# Patient Record
Sex: FEMALE | Race: WHITE
[De-identification: names, ages, dates, MRNs, and addresses within clinical notes are randomized per-mention and may not be internally consistent; named-entity substitution may affect disease eponyms.]

---

## 2017-09-15 ENCOUNTER — HOSPITAL ENCOUNTER (EMERGENCY)
Dept: HOSPITAL 45 - C.EDB | Age: 37
Discharge: HOME | End: 2017-09-15
Payer: SELF-PAY

## 2017-09-15 VITALS
WEIGHT: 165.57 LBS | WEIGHT: 165.57 LBS | BODY MASS INDEX: 28.27 KG/M2 | HEIGHT: 64.02 IN | BODY MASS INDEX: 28.27 KG/M2 | HEIGHT: 64.02 IN

## 2017-09-15 VITALS — TEMPERATURE: 98.06 F

## 2017-09-15 VITALS — DIASTOLIC BLOOD PRESSURE: 85 MMHG | OXYGEN SATURATION: 98 % | SYSTOLIC BLOOD PRESSURE: 132 MMHG | HEART RATE: 77 BPM

## 2017-09-15 DIAGNOSIS — Z86.19: ICD-10-CM

## 2017-09-15 DIAGNOSIS — Z98.51: ICD-10-CM

## 2017-09-15 DIAGNOSIS — N39.0: Primary | ICD-10-CM

## 2017-09-15 DIAGNOSIS — Z87.440: ICD-10-CM

## 2017-09-15 NOTE — EMERGENCY ROOM VISIT NOTE
ED Visit Note


First contact with patient:  09:19


CHIEF COMPLAINT:  Frequent and painful urination for 2 days





HISTORY OF PRESENT ILLNESS:  Patient is an otherwise healthy 37-year-old white 

female who presents to the emergency department for evaluation of UTI symptoms 

started about 2 days ago.  She notes urinary frequency, urgency, dysuria and a 

feeling of incomplete voiding.  She denies back pain, fever, or vaginal 

discharge.  Last menstrual period was 2 weeks ago, and she denies pregnancy 

secondary to a tubal ligation.  She has not had a UTI in several years.  She 

tried using over-the-counter Azo which helped slightly.





REVIEW OF SYSTEMS:  Review of systems as per HPI.  All other systems reviewed 

were negative.  At least 6 systems reviewed.





PMH:  Electronic medical records are reviewed and summarized as above/below.  

See Problem List.





SOCIAL HISTORY:  Patient lives at home with her  and family.  Smoker.  

She is employed.





PHYSICAL EXAM: Vital Signs: Reviewed Nurse's notes. 


CONSTITUTIONAL: Patient is a pleasant, well-appearing 37-year-old white female 

who is awake and alert and in no acute distress.


CARDIOVASCULAR: Regular rate and rhythm.


RESPIRATORY: Breath sounds equal and clear to auscultation without wheezes, 

rales, or rhonchi heard.   Full and equal chest expansion without accessory 

muscle use or retractions. 


ABDOMEN: Bowel sounds are present.  Abdomen is soft, nontender and 

nondistended.  No CVA tenderness.


INTEGUMENTARY: No lesions or rash, normal skin turgor. 


LYMPH: No lymphadenopathy.





EMERGENCY DEPARTMENT COURSE: Patient provided a urine sample which was sent for 

culture.  Old records were reviewed.  Prior urine cultures show pansensitive 

Escherichia coli.  Patient was given a Pyridium home pack and a dose of Bactrim 

DS in the emergency department, a prescription for a 7 day course.  

Differential diagnoses entertained includes UTI, hemorrhagic cystitis, PID, 

cervicitis, pyelonephritis among others.





Medication reconciliation: I attest that I have personally reviewed the patient'

s current medication list.





Blood pressure screening : Patient was found to have normal blood pressure on 

screening and does not require follow-up.


Problem List


Medical Problems:


(1) Acute bronchitis


Status: Resolved  





(2) Bronchitis


Status: Resolved  





(3) Encounter for removal of sutures


Status: Resolved  





(4) Laceration of face, complicated


Status: Resolved  





(5) Right knee pain


Status: Resolved  





(6) Sinusitis


Status: Resolved  





(7) Syncope


Status: Resolved  





(8) UTI (urinary tract infection)


Status: Resolved  





Surgical Problems:


(1)  section x2


Status: Resolved  





(2) History of tubal ligation


Status: Resolved  











Current/Historical Medications


Scheduled


Phenazopyridine HCl (Pyridium), 200 MG PO TID


Phenazopyridine Hcl (Azo Tabs), 1 TAB PO BID


Sulfa/Trimethoprim (Bactrim Ds 800MG/160MG), 1 TAB PO BID





Scheduled PRN


Hydrocodone/Acetaminophen 5MG/325MG (Norco 5MG/325MG), 1 TABLET PO TID PRN for 

Pain





Allergies


Coded Allergies:  


     No Known Allergies (Verified , 13)





Vital Signs











  Date Time  Temp Pulse Resp B/P (MAP) Pulse Ox O2 Delivery O2 Flow Rate FiO2


 


9/15/17 09:43  77 20 132/85 98   


 


9/15/17 09:09 36.7 77 20 132/85 98 Room Air  











Medications Administered











 Medications


  (Trade)  Dose


 Ordered  Sig/Susana


 Route  Start Time


 Stop Time Status Last Admin


Dose Admin


 


 Phenazopyridine


 HCl


  (Phenazopyridine


 HCl 200MG Home


 Pack)  1 homepack  UD  ONCE


 PO  9/15/17 09:30


 9/15/17 09:31 DC 9/15/17 09:41


1 HOMEPACK


 


 Trimethoprim/


 Sulfamethoxazole


  (Septra Ds 800/


 160MG Tab)  1 tab  NOW  STAT


 PO  9/15/17 09:29


 9/15/17 09:31 DC 9/15/17 09:41


1 TAB











Departure Information


Impression





 Primary Impression:  


 UTI (urinary tract infection)





Prescriptions





Phenazopyridine HCl (Pyridium) 200 Mg Tab


200 MG PO TID, #10 TAB


   Prov: Halie Branham PA         9/15/17 


Sulfa/Trimethoprim (Bactrim Ds 800MG/160MG)  Tab


1 TAB PO BID, #14 TAB


   Prov: Halie Branham PA         9/15/17





Referrals


Jose Gallagher D.OMeliton (PCP)





Patient Instructions


My [facility]





Additional Instructions





Trimethoprim-Sulfamethoxazole(Bactrim DS): Take one pill twice daily for 7 days 

for your urine infection.  All antibiotics can cause diarrhea.  If this occurs 

and you feel worse or it does not resolve in 1-2 days follow up with your 

doctor or return to the Emergency Department as this could be signs of serious 

underlying problems.  Any medication can cause an allergic reaction, stop the 

pills immediately and return to the ER for rash, hives, breathing difficulties, 

or swelling.





Pyridium 200mg: Take one pill three times daily as needed for urinary 

discomfort.  This medication will turn your urine orange.  This is normal and 

nothing to be concerned about.





Ibuprofen(Motrin, Advil) may be used for fever or pain.  Use 600mg every six 

hours as needed.  Take with food.  Avoid using more than 2400mg in a 24 hour 

period.  Do not use 2400mg per day for more than three consecutive days without 

physician direction.  Prolonged inappropriate use can lead to stomach upset or 

ulcers.  This is available over the counter and typically comes in 200mg 

tablets.  


(AND/OR)


Acetaminophen(Tylenol) may be used for fever or pain.  Use 1000mg every eight 

hours as needed.  Avoid using more than 3000mg in a 24 hour period.  This is 

available over the counter.





Read all the package inserts or medication information paperwork provided.  If 

you have any questions or concerns call your primary provider, pharmacist or 

the ER for assistance.





Rest and drink plenty of fluids.





Continue current medications.





Return to the ER immediately for worsening or persistent abdominal pain, 

vomiting, fevers, back or flank pain, worsening of your condition, or as needed.





Follow up with your primary physician within 2-3 days for a recheck of the 

current condition.

## 2017-11-17 ENCOUNTER — HOSPITAL ENCOUNTER (EMERGENCY)
Dept: HOSPITAL 45 - C.EDB | Age: 37
Discharge: HOME | End: 2017-11-17
Payer: SELF-PAY

## 2017-11-17 VITALS
HEIGHT: 64.02 IN | WEIGHT: 167.77 LBS | BODY MASS INDEX: 28.64 KG/M2 | BODY MASS INDEX: 28.64 KG/M2 | WEIGHT: 167.77 LBS | HEIGHT: 64.02 IN

## 2017-11-17 VITALS — TEMPERATURE: 98.6 F

## 2017-11-17 VITALS — HEART RATE: 78 BPM | SYSTOLIC BLOOD PRESSURE: 139 MMHG | DIASTOLIC BLOOD PRESSURE: 78 MMHG | OXYGEN SATURATION: 98 %

## 2017-11-17 DIAGNOSIS — Z82.49: ICD-10-CM

## 2017-11-17 DIAGNOSIS — F17.200: ICD-10-CM

## 2017-11-17 DIAGNOSIS — Z87.442: ICD-10-CM

## 2017-11-17 DIAGNOSIS — R07.9: Primary | ICD-10-CM

## 2017-11-17 DIAGNOSIS — Z98.51: ICD-10-CM

## 2017-11-17 LAB
ALBUMIN/GLOB SERPL: 1.1 {RATIO} (ref 0.9–2)
ALP SERPL-CCNC: 51 U/L (ref 45–117)
ALT SERPL-CCNC: 27 U/L (ref 12–78)
ANION GAP SERPL CALC-SCNC: 9 MMOL/L (ref 3–11)
APPEARANCE UR: CLEAR
AST SERPL-CCNC: 13 U/L (ref 15–37)
BASOPHILS # BLD: 0.01 K/UL (ref 0–0.2)
BASOPHILS NFR BLD: 0.1 %
BILIRUB UR-MCNC: (no result) MG/DL
BUN SERPL-MCNC: 10 MG/DL (ref 7–18)
BUN/CREAT SERPL: 10.7 (ref 10–20)
CALCIUM SERPL-MCNC: 9.1 MG/DL (ref 8.5–10.1)
CHLORIDE SERPL-SCNC: 105 MMOL/L (ref 98–107)
CO2 SERPL-SCNC: 26 MMOL/L (ref 21–32)
COLOR UR: (no result)
COMPLETE: YES
CREAT CL PREDICTED SERPL C-G-VRATE: 84.6 ML/MIN
CREAT SERPL-MCNC: 0.91 MG/DL (ref 0.6–1.2)
EOSINOPHIL NFR BLD AUTO: 252 K/UL (ref 130–400)
GLOBULIN SER-MCNC: 3.7 GM/DL (ref 2.5–4)
GLUCOSE SERPL-MCNC: 79 MG/DL (ref 70–99)
HCT VFR BLD CALC: 39.8 % (ref 37–47)
IG%: 0.3 %
IMM GRANULOCYTES NFR BLD AUTO: 29.9 %
LYMPHOCYTES # BLD: 3.19 K/UL (ref 1.2–3.4)
MANUAL MICROSCOPIC REQUIRED?: NO
MCH RBC QN AUTO: 32.2 PG (ref 25–34)
MCHC RBC AUTO-ENTMCNC: 35.2 G/DL (ref 32–36)
MCV RBC AUTO: 91.5 FL (ref 80–100)
MONOCYTES NFR BLD: 6.8 %
NEUTROPHILS # BLD AUTO: 0.8 %
NEUTROPHILS NFR BLD AUTO: 62.1 %
NITRITE UR QL STRIP: (no result)
PH UR STRIP: 5 [PH] (ref 4.5–7.5)
PMV BLD AUTO: 12.1 FL (ref 7.4–10.4)
POTASSIUM SERPL-SCNC: 3.3 MMOL/L (ref 3.5–5.1)
RBC # BLD AUTO: 4.35 M/UL (ref 4.2–5.4)
REVIEW REQ?: NO
SODIUM SERPL-SCNC: 140 MMOL/L (ref 136–145)
SP GR UR STRIP: 1.03 (ref 1–1.03)
URINE BILL WITH OR WITHOUT MIC: (no result)
URINE EPITHELIAL CELL AUTO: (no result) /LPF (ref 0–5)
UROBILINOGEN UR-MCNC: (no result) MG/DL
WBC # BLD AUTO: 10.67 K/UL (ref 4.8–10.8)

## 2017-11-17 NOTE — DIAGNOSTIC IMAGING REPORT
CT ANGIOGRAPHY OF THE CHEST, PULMONARY EMBOLUS PROTOCOL



CLINICAL HISTORY: Right sided rib and back pain. No trauma.    



COMPARISON STUDY:  Chest CT March 23, 2015 and chest radiograph performed

earlier today. 



TECHNIQUE: Following IV administration of 93 mL of Optiray-320, helical axial

images of the chest were obtained utilizing the pulmonary embolus protocol. 

Maximal intensity projections and sagittal and coronal reformats were viewed on

an independent 3D workstation.  IV contrast was administered without

complication.  A dose lowering technique was utilized adhering to the principles

of ALARA.





CT DOSE: 345.96 mGy.cm



FINDINGS:  No pulmonary emboli are identified. The size of the heart is normal.

There is no pericardial effusion. There is no evidence of thoracic aortic

dissection. No enlarged axillary, mediastinal or hilar lymph nodes are present.

Central airways are patent. There is no consolidation to suggest pneumonia. No

pneumomediastinum is present. There is no pneumothorax or pleural effusion. No

fractures are identified within visualized portions of the ribs. Upper abdomen

is unremarkable.



IMPRESSION:  



1. No pulmonary emboli identified.



2. No acute intrathoracic findings.











Electronically signed by:  Boni Napier M.D.

11/17/2017 9:46 PM



Dictated Date/Time:  11/17/2017 9:38 PM

## 2017-11-17 NOTE — DIAGNOSTIC IMAGING REPORT
CHEST ONE VIEW PORTABLE



CLINICAL HISTORY: Right sided rib and back pain. No injury.    



COMPARISON STUDY:  Chest radiograph August 14, 2016.



FINDINGS: Lung volumes are normal. Lungs are clear. No pneumothorax or pleural

effusion is present. Pulmonary vascularity is normal. Cardiomediastinal

silhouette is normal. 



IMPRESSION:  No acute cardiopulmonary findings. 









Electronically signed by:  Boni Napier M.D.

11/17/2017 8:36 PM



Dictated Date/Time:  11/17/2017 8:35 PM

## 2017-11-17 NOTE — EMERGENCY ROOM VISIT NOTE
History


First contact with patient:  19:53


Chief Complaint:  RIB PAIN


Stated Complaint:  PAIN R SIDE, RIB CAGE AREA FOR 4 DAYS





History of Present Illness


The patient is a 37 year old female who presents to the Emergency Room with 

complaints of right sided rib and back pain that started 4 days ago.  The 

patient denies any significant injury.  She does admit to being fairly active 

at work.  She works at a [employer].  The pain is slightly worse with movement.

  It is also worsened with deep inspiration.  She denies any pressure in the 

front of her chest.  No nausea or vomiting.  No dizziness, lightheadedness, 

heart palpitations or sweating.  The patient has tried ibuprofen with minimal 

relief of the pain.





Review of Systems


10 system review performed and  negative unless noted in HPI or below





Past Medical/Surgical History


Medical Problems:


(1) Acute bronchitis


(2) Bronchitis


(3) Encounter for removal of sutures


(4) Laceration of face, complicated


(5) Right knee pain


(6) Sinusitis


(7) Syncope


(8) UTI (urinary tract infection)


Surgical Problems:


(1)  section x2


(2) History of tubal ligation








Family History





Hypertension





Social History


Smoking Status:  Current Every Day Smoker


Alcohol Use:  none


Drug Use:  none


Marital Status:  in relationship


Housing Status:  lives with family


Occupation Status:  employed





Current/Historical Medications


Scheduled PRN


Ibuprofen (Advil), 400 MG PO Q6 PRN for Pain





Physical Exam


Vital Signs











  Date Time  Temp Pulse Resp B/P (MAP) Pulse Ox O2 Delivery O2 Flow Rate FiO2


 


17 19:47 37.0 122 18 135/85 99 Room Air  











Physical Exam


VITALS: Vitals are noted on the nurse's note and reviewed by myself.  Vital 

signs stable.


GENERAL: 37-year-old female, mildly uncomfortable,nondiaphoretic, well-

developed well-nourished.


SKIN: The skin was without rashes, erythema, edema, or bruising.  


HEAD: Normocephalic atraumatic.  


NECK: Supple without nuchal rigidity.  No lymphadenopathy. No JVD.


HEART: Regular rate and rhythm without murmurs gallops or rubs.


No tenderness palpated over the thorax.


LUNGS: Clear to auscultation bilaterally without wheezes, rales or rhonchi.   

No accessory muscle use.


ABDOMEN: Positive bowel sounds x 4.Soft, nontender, without organomegaly. No 

guarding or rebound tenderness.


MUSCULOSKELETAL: Slight discomfort with abduction of the right shoulder.  No 

tenderness elicited over the trapezius muscle.  No tenderness over the scapula.

  No tenderness over the ribs bilaterally.  Strength 5/5 throughout.


NEURO: Patient was alert and oriented to person place and time.  Normal 

sensation to touch. No focal neurological deficits.





Medical Decision & Procedures


ER Provider


Diagnostic Interpretation:


CT chest w/ contrast


IMPRESSION:  





1. No pulmonary emboli identified.





2. No acute intrathoracic findings.

















Electronically signed by:  Boni Napier M.D.


2017 9:46 PM





Dictated Date/Time:  2017 9:38 PM





 The status of this report is Signed.   


 Draft = Not yet reviewed or approved by Radiologist.  


 Signed = Reviewed and approved by Radiologist.








CXR


IMPRESSION:  No acute cardiopulmonary findings. 














Electronically signed by:  Boni Napier M.D.


2017 8:36 PM





Dictated Date/Time:  2017 8:35 PM





 The status of this report is Signed.   


 Draft = Not yet reviewed or approved by Radiologist.  


 Signed = Reviewed and approved by Radiologist.





Laboratory Results


17 20:25








Red Blood Count 4.35, Mean Corpuscular Volume 91.5, Mean Corpuscular Hemoglobin 

32.2, Mean Corpuscular Hemoglobin Concent 35.2, Mean Platelet Volume 12.1, 

Neutrophils (%) (Auto) 62.1, Lymphocytes (%) (Auto) 29.9, Monocytes (%) (Auto) 

6.8, Eosinophils (%) (Auto) 0.8, Basophils (%) (Auto) 0.1, Neutrophils # (Auto) 

6.62, Lymphocytes # (Auto) 3.19, Monocytes # (Auto) 0.73, Eosinophils # (Auto) 

0.09, Basophils # (Auto) 0.01





17 20:25

















Test


  17


20:25 17


21:15


 


White Blood Count


  10.67 K/uL


(4.8-10.8) 


 


 


Red Blood Count


  4.35 M/uL


(4.2-5.4) 


 


 


Hemoglobin


  14.0 g/dL


(12.0-16.0) 


 


 


Hematocrit 39.8 % (37-47)  


 


Mean Corpuscular Volume


  91.5 fL


() 


 


 


Mean Corpuscular Hemoglobin


  32.2 pg


(25-34) 


 


 


Mean Corpuscular Hemoglobin


Concent 35.2 g/dl


(32-36) 


 


 


Platelet Count


  252 K/uL


(130-400) 


 


 


Mean Platelet Volume


  12.1 fL


(7.4-10.4) 


 


 


Neutrophils (%) (Auto) 62.1 %  


 


Lymphocytes (%) (Auto) 29.9 %  


 


Monocytes (%) (Auto) 6.8 %  


 


Eosinophils (%) (Auto) 0.8 %  


 


Basophils (%) (Auto) 0.1 %  


 


Neutrophils # (Auto)


  6.62 K/uL


(1.4-6.5) 


 


 


Lymphocytes # (Auto)


  3.19 K/uL


(1.2-3.4) 


 


 


Monocytes # (Auto)


  0.73 K/uL


(0.11-0.59) 


 


 


Eosinophils # (Auto)


  0.09 K/uL


(0-0.5) 


 


 


Basophils # (Auto)


  0.01 K/uL


(0-0.2) 


 


 


RDW Standard Deviation


  43.2 fL


(36.4-46.3) 


 


 


RDW Coefficient of Variation


  12.8 %


(11.5-14.5) 


 


 


Immature Granulocyte % (Auto) 0.3 %  


 


Immature Granulocyte # (Auto)


  0.03 K/uL


(0.00-0.02) 


 


 


D-Dimer


  520 ug/L FEU


(0-500) 


 


 


Anion Gap


  9.0 mmol/L


(3-11) 


 


 


Est Creatinine Clear Calc


Drug Dose 84.6 ml/min 


  


 


 


Estimated GFR (


American) 93.4 


  


 


 


Estimated GFR (Non-


American 80.6 


  


 


 


BUN/Creatinine Ratio 10.7 (10-20)  


 


Calcium Level


  9.1 mg/dl


(8.5-10.1) 


 


 


Total Bilirubin


  0.4 mg/dl


(0.2-1) 


 


 


Aspartate Amino Transf


(AST/SGOT) 13 U/L (15-37) 


  


 


 


Alanine Aminotransferase


(ALT/SGPT) 27 U/L (12-78) 


  


 


 


Alkaline Phosphatase


  51 U/L


() 


 


 


Troponin I


  < 0.015 ng/ml


(0-0.045) 


 


 


Total Protein


  7.8 gm/dl


(6.4-8.2) 


 


 


Albumin


  4.1 gm/dl


(3.4-5.0) 


 


 


Globulin


  3.7 gm/dl


(2.5-4.0) 


 


 


Albumin/Globulin Ratio 1.1 (0.9-2)  


 


Lipase


  227 U/L


() 


 


 


Urine Color  DK YELLOW 


 


Urine Appearance  CLEAR (CLEAR) 


 


Urine pH  5.0 (4.5-7.5) 


 


Urine Specific Gravity


  


  1.030


(1.000-1.030)


 


Urine Protein  NEG (NEG) 


 


Urine Glucose (UA)  NEG (NEG) 


 


Urine Ketones  TRACE (NEG) 


 


Urine Occult Blood  2+ (NEG) 


 


Urine Nitrite  NEG (NEG) 


 


Urine Bilirubin  NEG (NEG) 


 


Urine Urobilinogen  NEG (NEG) 


 


Urine Leukocyte Esterase  NEG (NEG) 


 


Urine WBC (Auto)  1-5 /hpf (0-5) 


 


Urine RBC (Auto)


  


  5-10 /hpf


(0-4)


 


Urine Hyaline Casts (Auto)  1-5 /lpf (0-5) 


 


Urine Epithelial Cells (Auto)


  


  20-30 /lpf


(0-5)


 


Urine Bacteria (Auto)  NEG (NEG) 


 


Urine Pregnancy Test  NEG (NEG) 











Medications Administered











 Medications


  (Trade)  Dose


 Ordered  Sig/Susana


 Route  Start Time


 Stop Time Status Last Admin


Dose Admin


 


 Ketorolac


 Tromethamine


  (Toradol Inj)  30 mg  NOW  STAT


 IV  17 20:06


 17 20:08 DC 17 21:03


30 MG


 


 Cyclobenzaprine


 HCl


  (Flexeril Tab)  10 mg  NOW  STAT


 PO  17 20:06


 17 20:09 DC 17 21:02


10 MG


 


 Morphine Sulfate


  (MoRPHine


 SULFATE INJ)  4 mg  ONE  STAT


 IV  17 21:25


 17 21:26 DC 17 21:51


4 MG











ECG


Indication:  back/shoulder pain


Rate (beats per minute):  87


Rhythm:  normal sinus


Findings:  other (nonspecific ST changes in II and III.  No reciprocal changes 

noted.)


Change:  no significant change





ED Course


Patient was seen and examined


Vital signs including  blood pressure were reviewed


medications list was verified with patient


The patient was put on a monitor


Labs were obtained, and a saline lock was established


The patient was medicated with Toradol and Flexeril.


Imaging was performed and reviewed


Upon reevaluation, the patient was still complaining of pain.  She was given 1 

dose of morphine 4 mg IV


We discussed the results of her workup.  She will voiced understanding.  She 

was resting more comfortably.


I reviewed discharge instructions the patient.  They voiced understanding and 

had no further questions.





Medical Decision


Differential diagnosis: Acute myocardial infarction, cardiac arrhythmia, anemia

, thyroid abnormality, pneumothorax, pneumonia, bronchitis, pericarditis, 

electrolyte imbalance , musculoskeletal pain, rib contusion, rib fracture, 

pulmonary embolus





This patient is a 37-year-old female that presents to the emergency department 

with complaints of right-sided rib pain radiating to her back.  She did not 

have any significant trauma.  I cannot elicit a significant amount of 

discomfort with range of motion or palpation.  She does smoke.  Her d-dimer was 

slightly elevated.  For this reason, I opted to do a CT of the chest to rule 

out PE.  This was negative for any pulmonary embolus or trauma.  Her EKG is 

unchanged when compared to previous EKGs.  Her troponin is negative.  I do not 

suspect acute cardiac ischemia.  This is likely musculoskeletal pain.  The 

patient was given a short course of a muscle relaxant and tramadol.  She was 

instructed to follow-up with her primary care physician if there is no 

improvement in the next 5 days.  She'll return to the emergency department with 

any new or worsening symptoms.





This chart was completed in part utilizing Dragon Speech Voice Recognition 

software. Attempts were made to minimize the grammatical errors, random word 

insertions, pronoun errors and incomplete sentences. Any formal questions or 

concerns about the content, text or information contained within the body of 

this dictation should be directly addressed to the provider for clarification.





Medication Reconcilliation


Current Medication List:  was personally reviewed by me





Blood Pressure Screening


Patient's blood pressure:  Normal blood pressure





Impression





 Primary Impression:  


 Right-sided chest pain





Departure Information


Dispostion


Home / Self-Care





Condition


CONVENIENCE OF 





Prescriptions





Cyclobenzaprine Hcl (FLEXERIL) 10 Mg Tab


10 MG PO TID for Muscle Spasms, #15 TAB


   Prov: Karen Grigsby PA-C         17 


Tramadol (Ultram) 50 Mg Tab


1 TAB PO Q4H Y for Pain, #15 TAB


   For Initial Treatment


   Prov: Karen Grigsby PA-C         17





Referrals


No Doctor, Assigned (PCP)





Patient Instructions


My [facility]





Additional Instructions





You were evaluated in the emergency department for right-sided chest/rib pain.  

This is likely musculoskeletal in nature.





No strenuous activity for 2 days.  Please apply a heating pad intermittently to 

the affected area





Ibuprofen 600 mg every 8 hours as needed for pain.


Ultram 1 tab every 4 hours as needed for severe pain.  This may be taken with 

ibuprofen.





Flexeril 1 tab every 8 hours as needed for muscle spasms/pain.  Please do not 

drive or operate machinery while taking this medication.  It may make you 

drowsy.





Please follow-up with her primary care physician if there is no improvement in 

the next 5 days.





Please return to the emergency department with any new or concerning symptoms.

## 2018-01-01 ENCOUNTER — HOSPITAL ENCOUNTER (EMERGENCY)
Dept: HOSPITAL 45 - C.EDB | Age: 38
Discharge: HOME | End: 2018-01-01
Payer: SELF-PAY

## 2018-01-01 VITALS
HEIGHT: 65 IN | BODY MASS INDEX: 29.13 KG/M2 | HEIGHT: 65 IN | WEIGHT: 174.83 LBS | BODY MASS INDEX: 29.13 KG/M2 | WEIGHT: 174.83 LBS

## 2018-01-01 VITALS — HEART RATE: 84 BPM | OXYGEN SATURATION: 99 % | SYSTOLIC BLOOD PRESSURE: 129 MMHG | DIASTOLIC BLOOD PRESSURE: 93 MMHG

## 2018-01-01 VITALS — TEMPERATURE: 98.24 F

## 2018-01-01 DIAGNOSIS — Z82.49: ICD-10-CM

## 2018-01-01 DIAGNOSIS — N39.0: Primary | ICD-10-CM

## 2018-01-01 DIAGNOSIS — Z98.51: ICD-10-CM

## 2018-01-01 DIAGNOSIS — Z98.891: ICD-10-CM

## 2018-01-01 DIAGNOSIS — Z87.440: ICD-10-CM

## 2018-01-01 NOTE — EMERGENCY ROOM VISIT NOTE
History


First contact with patient:  08:44


Chief Complaint:  URINARY SYMPTOMS


Stated Complaint:  URINARY TRACT INFECTION





History of Present Illness


The patient is a 37 year old female who presents to the Emergency Room with 

complaints of urinary symptoms.  The patient reports that she has had urinary 

urgency and dysuria for the past 6 hours.  She has a history of UTIs and states 

this feels similar.  She rates her discomfort a 5/10.  She denies abdominal pain

, back pain, vaginal discharge or fevers.





Review of Systems


A complete 10 point review of systems was reviewed with the patient with 

pertinent positives and negatives as per history of present illness. All else 

were negative.





Past Medical/Surgical History


Medical Problems:


(1) Acute bronchitis


(2) Bronchitis


(3) Encounter for removal of sutures


(4) Laceration of face, complicated


(5) Right knee pain


(6) Sinusitis


(7) Syncope


(8) UTI (urinary tract infection)


Surgical Problems:


(1)  section x2


(2) History of tubal ligation








Family History





Hypertension





Social History


Smoking Status:  Never Smoker


Alcohol Use:  none


Drug Use:  none


Marital Status:  in relationship


Housing Status:  lives with family


Occupation Status:  employed





Current/Historical Medications


No Active Prescriptions or Reported Meds





Physical Exam


Vital Signs











  Date Time  Temp Pulse Resp B/P (MAP) Pulse Ox O2 Delivery O2 Flow Rate FiO2


 


18 08:41 36.8 92 18 126/81 98 Room Air  











Physical Exam


VITALS: Vitals are noted on the nurse's note and reviewed by myself.  Vital 

signs stable.


GENERAL: This is a 37-year-old female, in no acute distress, nondiaphoretic, 

well-developed well-nourished.


HEART: Regular rate and rhythm without murmurs gallops or rubs.


LUNGS: Clear to auscultation bilaterally without wheezes, rales or rhonchi.  


ABDOMEN: Positive bowel sounds x 4.  Soft, nontender to palpation.  No CVA 

tenderness.


NEURO: Patient was alert and oriented to person place and time.





Medical Decision & Procedures


Medical Decision


Differential diagnosis includes UTI, vaginal infection, interstitial cystitis, 

among others.





Patient was evaluated as above.  Presentation is consistent with urinary tract 

infection.  Culture was sent.  Urine dip was contaminated due to use of 

Pyridium.  Patient will be placed on Macrobid.  She verbalized understanding of 

my assessment and treatment plan and was discharged home in good condition.





Medication Reconcilliation


Current Medication List:  was personally reviewed by me





Blood Pressure Screening


Patient's blood pressure:  Normal blood pressure





Impression





 Primary Impression:  


 UTI (urinary tract infection)





Departure Information


Dispostion


Home / Self-Care





Condition


GOOD





Prescriptions





No Active Prescriptions or Reported Meds





Referrals


No Doctor, Assigned (PCP)





Patient Instructions


My [facility]





Additional Instructions





You have been treated in the Emergency Department for a Urinary Tract Infection 

(UTI).





You have been prescribed Macrobid to be taken twice daily as prescribed. This 

is an antibiotic. All antibiotics have the potential to cause diarrhea. Stop 

this medication and contact a medical provider if you were to develop any 

significant adverse side effects including: wheezing, shortness of breath, 

passing out, vomiting, or a diffuse rash. Always take antibiotics as directed 

and COMPLETE the ENTIRE course regardless of the improvement of your symptoms.





You have been prescribed Pyridium to be taken as prescribed. This medicine will 

help with the urinary symptoms that you have been experiencing. Be aware that 

Pyridium may turn your urine a red-orange or brown color. This effect is 

harmless.





Drink plenty of water and stay well hydrated.


As with any trip to the Emergency Department, you should follow-up with your 

Primary Care Provider from today's visit.





Return to the emergency department if your symptoms persist despite treatment 

plan outlined above or if the following symptoms occur: increased fevers, chills

, low back pain, nausea/vomiting, or blood in your urine.





Problem Qualifiers








 Primary Impression:  


 UTI (urinary tract infection)

## 2018-08-11 ENCOUNTER — HOSPITAL ENCOUNTER (EMERGENCY)
Dept: HOSPITAL 45 - C.EDB | Age: 38
Discharge: HOME | End: 2018-08-11
Payer: SELF-PAY

## 2018-08-11 VITALS — OXYGEN SATURATION: 100 % | HEART RATE: 77 BPM | DIASTOLIC BLOOD PRESSURE: 91 MMHG | SYSTOLIC BLOOD PRESSURE: 123 MMHG

## 2018-08-11 VITALS
HEIGHT: 64.02 IN | WEIGHT: 164.46 LBS | HEIGHT: 64.02 IN | WEIGHT: 164.46 LBS | BODY MASS INDEX: 28.08 KG/M2 | BODY MASS INDEX: 28.08 KG/M2

## 2018-08-11 VITALS — TEMPERATURE: 98.06 F

## 2018-08-11 DIAGNOSIS — F17.200: ICD-10-CM

## 2018-08-11 DIAGNOSIS — N39.0: Primary | ICD-10-CM

## 2018-08-12 NOTE — EMERGENCY ROOM VISIT NOTE
ED Visit Note


First contact with patient:  11:22


Chief Complaint: Urinary symptoms.





History of Present Illness: Ms. Smith is a 38-year-old white female who 

ambulates into the ED accompanied by her daughter complaining of urinary 

symptoms.


Historically patient reports she has typically had 2 urinary tract infections a 

year for many years.  Additionally she does reports she is status post  

section and tubal ligation.


Patient reports she was feeling well yesterday and then 3:00 this morning, 

approximately 8 hours ago, she woke from sleep to go to the bathroom and 

noticed urinary burning.  Since that time she has been having increasing 

urinary burning and increased urinary frequency and feelings of incomplete 

voiding after urination.


She reports the burning is at the late phase of her urination.  She rates her 

discomfort 6/10.  She also reports mild suprapubic discomfort.  She describes 

this as a cramping sensation.  She rates this discomfort 2/10.  This is 

constant and slightly worsened with urination.  She has not identified any 

alleviating factors related to the symptoms.  For her urinary burning she 

reports she is taken over-the-counter AZO twice since the onset of her symptoms.


She denies any associated fevers, chills, sweats, skin eruptions, skin color 

changes, upper abdominal pain, back/flank pain, nausea, vomiting, diarrhea, 

constipation, rectal bleeding, black/tarry stools, vaginal bleeding, vaginal 

discharge.





Review of Systems: As noted above in history of present illness.  8 body 

systems were reviewed and found to be negative as noted above.





Past Medical History: As previously noted.  


Current Medications: Patient denies.


Allergies to Medications: Patient denies.


Social History: Patient is currently employed; she feels safe in her home 

environment; she admits to tobacco use and denies alcohol use.





Physical Examination:


Vital Signs: 








  Date Time  Temp Pulse Resp B/P (MAP) Pulse Ox O2 Delivery O2 Flow Rate FiO2


 


18 12:32  77 18 123/91 100   


 


18 11:21 36.7 83 18 147/82 97 Room Air  





GENERAL: 38-year-old  female in mild distress due to pain, nontoxic-appearing, 

afebrile and hemodynamically stable.


NEUROLOGICAL: Awake, alert and oriented to person, place and time.  Answering 

questions appropriately and following commands.  Normal gait.  Good hand eye 

coordination.  No focal motor or sensory deficits.


SKIN: Warm, dry and pink.  No soft tissue eruptions or trauma noted.


HEENT:  Atraumatic and normocephalic.  PERRLA.  Sclera white and conjunctiva 

pink.  No drainage from naris.  Oral cavity moist and pink.  Pharynx is 

nonerythematous or edematous.  Speech normal.  No lymphadenopathy.  Trachea 

midline.  No jugular venous distention.


BACK:  No tenderness over the bony spine.  No CVA tenderness.  


THORAX:  Lungs sounds are clear to auscultation and equal bilaterally with 

symmetrical chest wall.  No wheezing, rales or rhonchi.  No crepitus, tenderness

, subcutaneous air or deformities noted.


HEART:  Regular rate and rhythm.  No gallops, rubs or murmurs are appreciated.


ABDOMEN: Flat and soft with mild suprapubic tenderness.  And nontender.  

Positive bowel sounds in all quadrants.  No guarding, rigidity or organomegaly.


EXTREMITIES:  Moves all extremities well on command and with purpose.  All 

distal neurovascular statuses are intact and equal bilaterally. 





ED Course:


Patient is assessed as noted above.


Patient's medication list was reviewed.


Laboratory Testing








Test


  18


11:30 Range/Units


 


 


Urine Color ORANGE  


 


Urine Appearance SLIGHTLY CLOUDY CLEAR  


 


Urine pH  4.5-7.5  


 


Urine Specific Gravity 1.010 1.000-1.030  


 


Urine Protein NEG NEG  


 


Urine Glucose (UA)  NEG  


 


Urine Ketones  NEG  


 


Urine Occult Blood  NEG  


 


Urine Nitrite  NEG  


 


Urine Bilirubin  NEG  


 


Urine Urobilinogen  NEG  


 


Urine Leukocyte Esterase  NEG  


 


Urine RBC >30 0-4  /hpf


 


Urine WBC >30 0-5  /hpf


 


Urine Epithelial Cells 10-20 0-5  /lpf


 


Urine Bacteria 1+ NEG  


 


Urine Pregnancy Test NEG NEG  





                              Urine Culture: Pending.


Patient was educated about today's findings and instructed on her treatment plan

; she verbalizes understanding and agreement with this plan.





Clinical Impression: Urinary tract infection





Disposition: Patient discharged home in stable condition accompanied by her 

daughter; prior to departure she was reassessed and subjectively reported she 

was feeling the same.





Plan:


Patient was prescribed Macrobid 100 mg 2 times a day for 7 days and Pyridium 

200 mg 3 times a day for 2 days.


Patient was encouraged not to use AZO additional lead to the Pyridium.


Patient was encouraged use ibuprofen or acetaminophen as needed for pain.


Patient was encouraged to stay well-hydrated with increased clear fluids.


Patient was encouraged to follow-up with family physician for recheck in 2-3 

days for culture results.


Patient was encouraged return the ED for worsening symptoms, fevers, vomiting, 

severe back pain or any new/concerning symptoms.